# Patient Record
(demographics unavailable — no encounter records)

---

## 2024-10-24 NOTE — ASSESSMENT
[FreeTextEntry1] : Solar lentigines - Discussed etiology and benign nature of condition - Sun protective measures reinforced. Recommended OTC sunscreen products, including SPF30+ with broadband UV protection as well as proper use.  Telangiectasias  - Sun protection reviewed    Neoplasm of uncertain behavior- left lower posterior leg  - Rule out ISK vs other  - Recommend tangential shave biopsy for definitive diagnosis. - After informed consent was obtained, using Alcohol for cleansing and 1% Lidocaine with epinephrine for anesthetic, with sterile technique a shave biopsy was used to obtain a biopsy specimen of the lesion. Hemostasis was obtained with aluminum chloride. Vaseline was applied, and wound care instructions provided. The specimen was labeled and sent to pathology for evaluation. The procedure was well tolerated without complication. - The patient will be contacted with biopsy results  RTC 1 year TBSE

## 2024-10-24 NOTE — PHYSICAL EXAM
[Alert] : alert [Oriented x 3] : ~L oriented x 3 [Well Nourished] : well nourished [Conjunctiva Non-injected] : conjunctiva non-injected [No Visual Lymphadenopathy] : no visual  lymphadenopathy [Declined] : declined [FreeTextEntry3] : Focused skin exam:  - b/l cheeks with hyperpigmented macules and dilated blood vessels  - left lower posterior leg with stuck on hyperpigmented plaque with mild erythema, no bleeding

## 2024-10-24 NOTE — HISTORY OF PRESENT ILLNESS
[FreeTextEntry1] : skin tags; spots on face ` [de-identified] : 52yo M here 3 years ago here for spot checks on face and body. Declines TBSE today  - spots on face appeared over summer, occasionally wears sunscreen, works as   - spot on left lower leg, has been irritating him with socks, occasionally picks at it   Derm hx: denies   Skin cancer hx: denies  fam hx: denies hx skin cancer